# Patient Record
(demographics unavailable — no encounter records)

---

## 2025-05-30 NOTE — HISTORY OF PRESENT ILLNESS
[Y] : Positive pregnancy history [Pregnancy History] : Vaginal delivery [___] : Delivery occurred at [unfilled] [TextBox_4] : 49yo  LMP 3/1/2024 presents for an annual gyn exam and refills of the clobetasol cream that she uses once weekly for the lichen sclerosus. She uses condoms for contraception and is happy with this form of contraception..   [PapSmeardate] : 5/30/2025 [facility] [[facility]] : 2 [facility]

## 2025-05-30 NOTE — PHYSICAL EXAM
[Chaperoned Physical Exam] : A chaperone was present in the examining room during all aspects of the physical examination. [MA] : MA [Examination Of The Breasts] : a normal appearance [No Masses] : no breast masses were palpable [Labia Majora] : normal [Labia Minora] : normal [Normal] : normal [Uterine Adnexae] : normal [FreeTextEntry2] : Uma

## 2025-07-15 NOTE — PHYSICAL EXAM
[Chaperoned Physical Exam] : A chaperone was present in the examining room during all aspects of the physical examination. [MA] : MA [Normal] : normal [FreeTextEntry2] : Uma